# Patient Record
Sex: FEMALE | Race: WHITE | NOT HISPANIC OR LATINO | Employment: UNEMPLOYED | URBAN - METROPOLITAN AREA
[De-identification: names, ages, dates, MRNs, and addresses within clinical notes are randomized per-mention and may not be internally consistent; named-entity substitution may affect disease eponyms.]

---

## 2022-09-19 ENCOUNTER — TRANSFERRED RECORDS (OUTPATIENT)
Dept: HEALTH INFORMATION MANAGEMENT | Facility: CLINIC | Age: 57
End: 2022-09-19

## 2022-09-26 ENCOUNTER — TRANSFERRED RECORDS (OUTPATIENT)
Dept: HEALTH INFORMATION MANAGEMENT | Facility: CLINIC | Age: 57
End: 2022-09-26

## 2023-02-03 ENCOUNTER — PRE VISIT (OUTPATIENT)
Dept: ORTHOPEDICS | Facility: CLINIC | Age: 58
End: 2023-02-03

## 2023-02-03 ENCOUNTER — OFFICE VISIT (OUTPATIENT)
Dept: ORTHOPEDICS | Facility: CLINIC | Age: 58
End: 2023-02-03
Payer: COMMERCIAL

## 2023-02-03 ENCOUNTER — ANCILLARY PROCEDURE (OUTPATIENT)
Dept: GENERAL RADIOLOGY | Facility: CLINIC | Age: 58
End: 2023-02-03
Attending: ORTHOPAEDIC SURGERY
Payer: COMMERCIAL

## 2023-02-03 ENCOUNTER — LAB (OUTPATIENT)
Dept: LAB | Facility: CLINIC | Age: 58
End: 2023-02-03
Payer: COMMERCIAL

## 2023-02-03 VITALS — HEIGHT: 67 IN | WEIGHT: 175 LBS | BODY MASS INDEX: 27.47 KG/M2

## 2023-02-03 DIAGNOSIS — M79.671 RIGHT FOOT PAIN: Primary | ICD-10-CM

## 2023-02-03 DIAGNOSIS — R22.41 FOOT MASS, RIGHT: ICD-10-CM

## 2023-02-03 DIAGNOSIS — M79.671 RIGHT FOOT PAIN: ICD-10-CM

## 2023-02-03 DIAGNOSIS — M05.79 RHEUMATOID ARTHRITIS INVOLVING MULTIPLE SITES WITH POSITIVE RHEUMATOID FACTOR (H): ICD-10-CM

## 2023-02-03 LAB
BASOPHILS # BLD AUTO: 0.1 10E3/UL (ref 0–0.2)
BASOPHILS NFR BLD AUTO: 1 %
CRP SERPL-MCNC: <3 MG/L
EOSINOPHIL # BLD AUTO: 0.3 10E3/UL (ref 0–0.7)
EOSINOPHIL NFR BLD AUTO: 4 %
ERYTHROCYTE [DISTWIDTH] IN BLOOD BY AUTOMATED COUNT: 14.1 % (ref 10–15)
ERYTHROCYTE [SEDIMENTATION RATE] IN BLOOD BY WESTERGREN METHOD: 7 MM/HR (ref 0–30)
HCT VFR BLD AUTO: 38.3 % (ref 35–47)
HGB BLD-MCNC: 12.8 G/DL (ref 11.7–15.7)
IMM GRANULOCYTES # BLD: 0 10E3/UL
IMM GRANULOCYTES NFR BLD: 0 %
LYMPHOCYTES # BLD AUTO: 2.1 10E3/UL (ref 0.8–5.3)
LYMPHOCYTES NFR BLD AUTO: 30 %
MCH RBC QN AUTO: 32 PG (ref 26.5–33)
MCHC RBC AUTO-ENTMCNC: 33.4 G/DL (ref 31.5–36.5)
MCV RBC AUTO: 96 FL (ref 78–100)
MONOCYTES # BLD AUTO: 0.6 10E3/UL (ref 0–1.3)
MONOCYTES NFR BLD AUTO: 8 %
NEUTROPHILS # BLD AUTO: 4.1 10E3/UL (ref 1.6–8.3)
NEUTROPHILS NFR BLD AUTO: 57 %
NRBC # BLD AUTO: 0 10E3/UL
NRBC BLD AUTO-RTO: 0 /100
PLATELET # BLD AUTO: 255 10E3/UL (ref 150–450)
RBC # BLD AUTO: 4 10E6/UL (ref 3.8–5.2)
WBC # BLD AUTO: 7.2 10E3/UL (ref 4–11)

## 2023-02-03 PROCEDURE — 85025 COMPLETE CBC W/AUTO DIFF WBC: CPT | Performed by: PATHOLOGY

## 2023-02-03 PROCEDURE — 85652 RBC SED RATE AUTOMATED: CPT | Performed by: PATHOLOGY

## 2023-02-03 PROCEDURE — 73630 X-RAY EXAM OF FOOT: CPT | Mod: RT | Performed by: RADIOLOGY

## 2023-02-03 PROCEDURE — 99204 OFFICE O/P NEW MOD 45 MIN: CPT | Performed by: ORTHOPAEDIC SURGERY

## 2023-02-03 PROCEDURE — 36415 COLL VENOUS BLD VENIPUNCTURE: CPT | Performed by: PATHOLOGY

## 2023-02-03 PROCEDURE — 86140 C-REACTIVE PROTEIN: CPT | Performed by: PATHOLOGY

## 2023-02-03 RX ORDER — FOLIC ACID 1 MG/1
1 TABLET ORAL
COMMUNITY
Start: 2022-10-10

## 2023-02-03 ASSESSMENT — ENCOUNTER SYMPTOMS
ARTHRALGIAS: 1
BACK PAIN: 0
STIFFNESS: 0
MUSCLE WEAKNESS: 0
MYALGIAS: 1
JOINT SWELLING: 1
NECK PAIN: 0
MUSCLE CRAMPS: 0

## 2023-02-03 NOTE — LETTER
"    2/3/2023         RE: Angie Leavitt  [address] [place] [address]        Dear Colleague,    Thank you for referring your patient, Angie Leavitt, to the [facility] [place]. Please see a copy of my visit note below.    Orthopaedic Surgery Clinic Note - New Patient      Chief Complaint:  Painful right foot mass.      History:  I had the opportunity to meet this pleasant 57-year-old patient today, as a new patient to me, for the above chief complaint. History is obtained from interview with the patient and from review of the EHR.  She has a history of rheumatoid arthritis. She has a history of having developed a mass at the right foot fifth MTP joint. She reports to me that this was not significantly painful for her at that time. She underwent excision of this mass and apparently a concurrent corticosteroid injection on 05/24/2022 by Dr. Unique Bass DPM, at [facility]. Pathology results are reviewed showing a final diagnosis of a \"benign cyst.\" The patient had continuing pain and swelling afterwards. She saw Dr. Chris Chavez DO, at [facility] for a confirmatory consultation, the office note from which is reviewed. Another MRI scan was performed in 09/2022 around the time she saw Dr. Chavez, which is not currently available. She returned to see Dr. Chavez afterwards and a recommendation was made to follow up with her rheumatologist. She has had such severe pain and prominence in the right foot at the surgical site that it has made shoewear difficult.  Her pain is worse that it was prior to her surgery. She has tried prednisone for the pain and swelling which she states did not help. She has tried foot orthoses. She denies any recent fevers, chills, chest pain, shortness of breath, or surgical site drainage.      Past Medical History:  No past medical history on file.  Review of the EHR shows a history of rheumatoid arthritis on methotrexate, left total hip " "arthroplasty, night sweats, and fatigue.    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      Medications:  Current Outpatient Medications   Medication Sig Dispense Refill     folic acid (FOLVITE) 1 MG tablet Take 1 tablet by mouth daily at 2 pm       methotrexate 2.5 MG tablet TAKE 7 TABLETS BY MOUTH EVERY WEEK         Exam:  Ht 1.702 m (5' 7\")   Wt 79.4 kg (175 lb)   BMI 27.41 kg/m      General: On examination today, the patient is pleasant, cooperative, awake and alert, and in no acute distress.  Nonseptic appearing from a general clinical standpoint.  Normal shoewear, removed for examination on both feet. No assistive gait devices visible.   Pulm: Respirations are nonlabored and regular on room air.  Dermatologic: The skin on the right foot and ankle is intact without visible open wounds, blisters, or any skin that appears to be at immediate obvious risk. Swollen enlargement at the right fifth MTP joint with erythema and a well-healed appearing surgical scar. There is a similar and somewhat smaller lesion of the contralateral left fifth MTP joint. No other visible or palpable masses/nodules in the right foot.  Musculoskeletal (focused exam of the righfoot/ankle): Palpable, tender, erytheatous mass versus swelling as noted at the right 5th MTP joint. Fifth toe adducted without visible active motion with motor testing as noted below. There is very little discernible passive motion present at the fifth MTP joint and attempts to  manipulate it appear painful.  Circulation: 3/4 easily palpable right PT pulse and 1/4 nonpalpable but Dopplerable right DP pulse with a warm well-perfused " foot.  Neurologic: Light touch sensation endorsed as intact right SPN, DPN, and tibial distributions, and diminished in right saphenous and sural distributions. 5/5 R TA, EHL, GS, PTT, and peroneals. 5/5 R EDL and FDL for toes 2-4 without visible active motion of 5th toe.      Imaging:  Independent review of imaging studies was performed including weightbearing AP, oblique, and lateral right foot radiographs dated today, 02/03/2023. Apparent bony erosion on the lateral aspect of the right 5th metatarsal head. Dorsomedially subluxated R 5th toe. Soft tissue swelling lateral to R 5th MTP joint. Subluxation and swelling similar to and somewhat progressed compared with 08/22/2022 radiographs. The relevant findings were discussed with and shown to the patient. No obvious evidence for tumor, infection, or acute fracture. Two screws present in medial malleolus.      Impression:  Recurrent soft tissue mass/swelling of right 5th MTP joint in a 57 year old female patient with rheumatoid arthritis.       Plan:  The above diagnosis and both nonsurgical and surgical treatment options were discussed with the patient all in layman's terms. She verbalized her wish to have the painful swelling and mass removed. I discussed the option of a revision excisional biopsy of right foot mass and reduction of the subluxated/dislocated right fifth MTP joint, including the nature of the procedure, the risks, benefits, and alternatives, and reasonable expectations for outcome. I discussed that in the small but nonzero chance of malignancy this could require additional surgery including amputation to obtain a wide enough margin. I discussed that the chance of this is low enough that it did not require an initial staged incisional biopsy. I discussed that surgery could involve an extensor tenotomy of the fifth toe and/or a shortening osteotomy of the fifth metatarsal to reduce the chronic subluxation/dislocation of the fifth MTP joint. I  discussed the plan for operative cultures as well to rule out infection. First I recommend obtaining preop ESR, CRP, and CBC/diff and obtaining the most recent 09/2022 [facility] MRI scan. Follow-up in 1-2 weeks to discuss. Signs and symptoms watch out for that should prompt immediate medical attention were discussed. All questions that this pleasant patient had at the time were answered to the best of my ability, and she verbalized understanding of and agreement with the treatment plan.  Answers for HPI/ROS submitted by the patient on 2/3/2023  General Symptoms: No  Skin Symptoms: No  HENT Symptoms: No  EYE SYMPTOMS: No  HEART SYMPTOMS: No  LUNG SYMPTOMS: No  INTESTINAL SYMPTOMS: No  URINARY SYMPTOMS: No  GYNECOLOGIC SYMPTOMS: No  BREAST SYMPTOMS: No  SKELETAL SYMPTOMS: Yes  BLOOD SYMPTOMS: No  NERVOUS SYSTEM SYMPTOMS: No  MENTAL HEALTH SYMPTOMS: No  Back pain: No  Muscle aches: Yes  Neck pain: No  Swollen joints: Yes  Joint pain: Yes  Bone pain: Yes  Muscle cramps: No  Muscle weakness: No  Joint stiffness: No  Bone fracture: No          Again, thank you for allowing me to participate in the care of your patient.        Sincerely,        Jose Antonio Calix MD

## 2023-02-03 NOTE — NURSING NOTE
"Reason For Visit:   Chief Complaint   Patient presents with     Consult     Right foot surgery - mass excision 5/2022.  Pain and mass still there, worsening. 3rd opinion. Was referred to rheumatologist. No additional foot surgery recommendations. Original surgeon \"won't see her\" Just said to go to PT.        Ht 1.702 m (5' 7\")   Wt 79.4 kg (175 lb)   BMI 27.41 kg/m           Miryam Garcia ATC    "

## 2023-02-03 NOTE — LETTER
Date:February 6, 2023      Provider requested that no letter be sent. Do not send.       [facility]

## 2023-02-03 NOTE — TELEPHONE ENCOUNTER
Action February 3, 2023 8:43 AM MT   Action Taken Sent an urgent request to  for imaging [phone].     DIAGNOSIS: 2nd opinion - Right foot pain, swollen,  lump on outside of small toe  / h/o of surgery   APPOINTMENT DATE: 02/03/2023   NOTES STATUS DETAILS   OFFICE NOTE from referring provider SELF    OFFICE NOTE from other specialist Care Everywhere 12/16/2022 - Trisha Julian MD -  Internal Med    08/22/2022 - Anand Berrios   OPERATIVE REPORT Care Everywhere 05/24/2022 - Excision of Soft Tissue Mass of RT Foot   MEDICATION LIST Care Everywhere    LABS     MRI PACS HP:  05/04/2022 - RT Foot   XRAYS (IMAGES & REPORTS) PACS HP:  08/22/2022 - RT Foot   TUMOR     PATHOLOGY  Slides & report Care Everywhere Collected: 05/24/2022  Case #: [id number]  Specimen: RT Foot  FINAL DIAGNOSIS: Foot, right soft tissue mass, biopsy:  Benign Cyst

## 2023-02-04 NOTE — PROGRESS NOTES
"Orthopaedic Surgery Clinic Note - New Patient      Chief Complaint:  Painful right foot mass.      History:  I had the opportunity to meet this pleasant 57-year-old patient today, as a new patient to me, for the above chief complaint. History is obtained from interview with the patient and from review of the EHR.  She has a history of rheumatoid arthritis. She has a history of having developed a mass at the right foot fifth MTP joint. She reports to me that this was not significantly painful for her at that time. She underwent excision of this mass and apparently a concurrent corticosteroid injection on 05/24/2022 by Dr. Unique Bass DPM, at [facility]. Pathology results are reviewed showing a final diagnosis of a \"benign cyst.\" The patient had continuing pain and swelling afterwards. She saw Dr. Chris Chavez DO, at [facility] for a confirmatory consultation, the office note from which is reviewed. Another MRI scan was performed in 09/2022 around the time she saw Dr. Chavez, which is not currently available. She returned to see Dr. Chavez afterwards and a recommendation was made to follow up with her rheumatologist. She has had such severe pain and prominence in the right foot at the surgical site that it has made shoewear difficult.  Her pain is worse that it was prior to her surgery. She has tried prednisone for the pain and swelling which she states did not help. She has tried foot orthoses. She denies any recent fevers, chills, chest pain, shortness of breath, or surgical site drainage.      Past Medical History:  No past medical history on file.  Review of the EHR shows a history of rheumatoid arthritis on methotrexate, left total hip arthroplasty, night sweats, and fatigue.    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      Medications:  Current Outpatient Medications   Medication Sig Dispense Refill     folic acid (FOLVITE) 1 MG tablet Take 1 tablet by mouth daily at 2 pm       methotrexate 2.5 MG tablet TAKE 7 TABLETS BY MOUTH EVERY WEEK         Exam:  Ht 1.702 m (5' 7\")   Wt 79.4 kg (175 lb)   BMI 27.41 kg/m      General: On examination today, the patient is pleasant, cooperative, awake and alert, and in no acute distress.  Nonseptic appearing from a general clinical standpoint.  Normal shoewear, removed for examination on both feet. No assistive gait devices visible.   Pulm: Respirations are nonlabored and regular on room air.  Dermatologic: The skin on the right foot and ankle is intact without visible open wounds, blisters, or any skin that appears to be at immediate obvious risk. Swollen enlargement at the right fifth MTP joint with erythema and a well-healed appearing surgical scar. There is a similar and somewhat smaller lesion of the contralateral left fifth MTP joint. No other visible or palpable masses/nodules in the right foot.  Musculoskeletal (focused exam of the righfoot/ankle): Palpable, tender, erytheatous mass versus swelling as noted at the right 5th MTP joint. Fifth toe adducted without visible active motion with motor testing as noted below. There is very little discernible passive motion present at the fifth MTP joint and attempts to  manipulate it appear painful.  Circulation: 3/4 easily palpable right PT pulse and 1/4 nonpalpable but Dopplerable right DP pulse with a warm well-perfused foot.  Neurologic: Light touch sensation endorsed as intact right SPN, DPN, and tibial distributions, and diminished in right saphenous and sural distributions. 5/5 R TA, EHL, GS, PTT, and peroneals. 5/5 R EDL and FDL for toes 2-4 without visible active motion of 5th " toe.      Imaging:  Independent review of imaging studies was performed including weightbearing AP, oblique, and lateral right foot radiographs dated today, 02/03/2023. Apparent bony erosion on the lateral aspect of the right 5th metatarsal head. Dorsomedially subluxated R 5th toe. Soft tissue swelling lateral to R 5th MTP joint. Subluxation and swelling similar to and somewhat progressed compared with 08/22/2022 radiographs. The relevant findings were discussed with and shown to the patient. No obvious evidence for tumor, infection, or acute fracture. Two screws present in medial malleolus.      Impression:  Recurrent soft tissue mass/swelling of right 5th MTP joint in a 57 year old female patient with rheumatoid arthritis.       Plan:  The above diagnosis and both nonsurgical and surgical treatment options were discussed with the patient all in layman's terms. She verbalized her wish to have the painful swelling and mass removed. I discussed the option of a revision excisional biopsy of right foot mass and reduction of the subluxated/dislocated right fifth MTP joint, including the nature of the procedure, the risks, benefits, and alternatives, and reasonable expectations for outcome. I discussed that in the small but nonzero chance of malignancy this could require additional surgery including amputation to obtain a wide enough margin. I discussed that the chance of this is low enough that it did not require an initial staged incisional biopsy. I discussed that surgery could involve an extensor tenotomy of the fifth toe and/or a shortening osteotomy of the fifth metatarsal to reduce the chronic subluxation/dislocation of the fifth MTP joint. I discussed the plan for operative cultures as well to rule out infection. First I recommend obtaining preop ESR, CRP, and CBC/diff and obtaining the most recent 09/2022 [facility] MRI scan. Follow-up in 1-2 weeks to discuss. Signs and symptoms watch out for that should  prompt immediate medical attention were discussed. All questions that this pleasant patient had at the time were answered to the best of my ability, and she verbalized understanding of and agreement with the treatment plan.  Answers for HPI/ROS submitted by the patient on 2/3/2023  General Symptoms: No  Skin Symptoms: No  HENT Symptoms: No  EYE SYMPTOMS: No  HEART SYMPTOMS: No  LUNG SYMPTOMS: No  INTESTINAL SYMPTOMS: No  URINARY SYMPTOMS: No  GYNECOLOGIC SYMPTOMS: No  BREAST SYMPTOMS: No  SKELETAL SYMPTOMS: Yes  BLOOD SYMPTOMS: No  NERVOUS SYSTEM SYMPTOMS: No  MENTAL HEALTH SYMPTOMS: No  Back pain: No  Muscle aches: Yes  Neck pain: No  Swollen joints: Yes  Joint pain: Yes  Bone pain: Yes  Muscle cramps: No  Muscle weakness: No  Joint stiffness: No  Bone fracture: No

## 2023-02-10 ENCOUNTER — OFFICE VISIT (OUTPATIENT)
Dept: ORTHOPEDICS | Facility: CLINIC | Age: 58
End: 2023-02-10
Payer: COMMERCIAL

## 2023-02-10 VITALS — HEIGHT: 67 IN | WEIGHT: 145 LBS | BODY MASS INDEX: 22.76 KG/M2

## 2023-02-10 DIAGNOSIS — S93.104D: ICD-10-CM

## 2023-02-10 DIAGNOSIS — M25.871 MASS OF JOINT OF RIGHT FOOT: Primary | ICD-10-CM

## 2023-02-10 PROCEDURE — 99214 OFFICE O/P EST MOD 30 MIN: CPT | Performed by: ORTHOPAEDIC SURGERY

## 2023-02-10 NOTE — PROGRESS NOTES
Chief complaint: Follow-up right fifth MTP mass.    I saw this very pleasant 57-year-old patient today with rheumatoid arthritis in follow-up for a painful, swollen right fifth toe/MTP joint after our initial consultation last week.  For full details please refer to my detailed note from 12/3/2023.  She still reports ongoing unimproved pain and swelling in that region, after having undergone a previous excision of soft tissue mass by Dr. Unique Bass at [facility] on 5/24/2022. Acute phase reactants have been drawn, a CRP of less than 3, serum WBC of 7.2 with 57% neutrophils, and an ESR of 7, all from 2/3/2023.  Her previous MRI scan has also been made available from September 19, 2022.  I have also had a chance to discuss her case with Dr. Chris Chavez DO, of [facility], who also saw and examined her as a previous confirmatory consultation in September 2022.  As noted previously, she has a history of rheumatoid arthritis and is currently on methotrexate.  However, this was diagnosed years ago soon after her birth of her son who is now in his early 20s, and then the RA went into remission.  She believes that this nodule in the right fifth toe may have developed sometime during the early years of her remission, still many years ago.  She endorses that it was really not significantly painful until after the excision by Dr. Bass when it recurred.  She reports she has only recently developed a recurrence of the rheumatoid arthritis prompting resumption of her methotrexate.  It is also noted that she has had a left wrist mass which was aspirated by Dr. Raghav Gutierrez of [facility] on 11/3/2022.    On exam of the right foot at this time is again noted the tender, slightly erythematous, enlarged appearance of the right fifth MTP joint.  The fifth toe has very little active motion present.  It is warm and well-perfused.  There is no drainage or skin break.  The previous incision is healed and dry.  The  swelling does appear to have the appearance of a mass on the dorsal lateral aspect of the right fifth MTP region.  The mass does not transilluminate with light.    I reviewed the outside MRI scan dated 9/19/2022, the images of which but not the report have now been made available, showing the mass in the right fifth toe MTP joint.  It does appear to be heterogeneous, and appears to be located subcutaneously dorsal lateral to the fifth metatarsal head, rather dorsal to the area of the lateral fifth metatarsal head erosion.  The lesion does not appear to have a signal similar to fat.  There does appear to be bony edema in the distal fifth metatarsal as well as the proximal phalanx of the fifth toe.  Radiographs from last week, 2/3/2023, are again reviewed with the patient, and shows bony erosion of the lateral aspect of the fifth metatarsal, though the distal apical and if it does appear to have a preserved joint.  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  Both the chronic subluxation/dislocation, and the lateral fifth metatarsal head erosion, appear to be increased compared with previous radiographs.  The region at or around the soft tissue mass noted on MRI appears to be manifested as an increased density and swelling noted in the dorsal lateral aspect of the fifth MTP.  It does not appear to show calcifications.  As noted, there does appear to be an associated bony erosion of the metatarsal head.    Impression/plan:  I had a long discussion with Ms. Leavitt today about her diagnosis and treatment options for the recurrent dorsal lateral right fifth metatarsal mass that appears to have recurred after previous excision by Dr Bass in May 2022.  I discussed that the labs make it less likely that this is infection, which I discussed is always a potential concern in the setting of previous surgery (it is noted that Dr. Bass also performed a corticosteroid injection in that region  at the time of the 5/24/2022 surgery).  I discussed the very small though nonzero chance of malignant transformation. I do note that the outside pathology report did not show malignancy.  I discussed the potential future treatments that a malignancy could entail.  With regard to management of her painful recurrent mass and toe dislocation at this time I discussed the nonsurgical and surgical treatment options that she has.  Nonsurgical options could include evaluation by her rheumatologist to discuss medical treatment options, such as potentially any new pharmacologic treatment and/or even injection into the nodule itself which has been reported.  Other conservative options could include customized orthopedic shoewear and foot orthoses to accommodate the lesion.  She did politely but firmly decline the option for continued nonsurgical care.  I also discussed the option for surgery consisting of a revision excisional biopsy of the right fifth MTP mass, a shortening osteotomy of the right fifth ray, possible extensor tenotomy, and repair of the MTP joint capsule.  I discussed the shortening of the fifth ray could involve a fifth metatarsal shortening osteotomy with internal fixation, versus resection of the fifth metatarsal head and/or fifth toe phalangeal base. I discussed that in an older, more sedentary individual with lower demands resection of the metatarsal head may be a better option, but in her case as a younger person with more functional demands I would recommend at least an initial attempt at preserving the metatarsal head cartilage, though I did explain that this may not sufficiently alleviate her pain necessitating additional surgery, due to ongoing pain from the erosion on the side of the metatarsal head would still be there with a shortening osteotomy but would have been removed with a metatarsal head resection.  A mutual decision was made at this time to accept the somewhat higher risk of revision  surgery, in order to attempt to preserve the cartilage of the fifth metatarsal head for now.  Risk, benefits, and alternatives to surgery (right fifth metatarsal shortening osteotomy, possible EDL tenotomy, relocation of the fifth toe, capsular repair, and repeat excisional biopsy of the right fifth MTP joint mass) were discussed in layman's terms.  All questions Ms. Leavitt had at the time were answered to the best of my ability.  I did agree to proceeding with moving forward towards scheduling surgery as requested, but do so sometime after 2/25/2023 when she reports she has an upcoming appointment with her rheumatologist.  I requested that she discuss the above options with them. I discussed that if a suitable medical option is discussed at that time, this may may prompt delaying or canceling surgery to give the other option a try.  All questions that this very pleasant patient had at the time were answered appropriately, and she verbalized understanding of and agreement with the treatment plan.

## 2023-02-10 NOTE — LETTER
Date:February 13, 2023      Provider requested that no letter be sent. Do not send.       [facility]

## 2023-02-10 NOTE — NURSING NOTE
Teaching Flowsheet   Relevant Diagnosis: Right 5th toe mass excision and realignment.  Teaching Topic: pre op and post op teaching.     Person(s) involved in teaching:   Patient     Motivation Level:  Asks Questions: Yes  Eager to Learn: Yes  Cooperative: Yes  Receptive (willing/able to accept information): Yes  Any cultural factors/Restorationist beliefs that may influence understanding or compliance? No       Patient demonstrates understanding of the following:  Reason for the appointment, diagnosis and treatment plan: Yes  Knowledge of proper use of medications and conditions for which they are ordered (with special attention to potential side effects or drug interactions): Yes  Which situations necessitate calling provider and whom to contact: Yes       Teaching Concerns Addressed: RN discussed all aspects of pre and post op with patient. Geovanna will call patient with surgery date. Patient will go to PCP for H and P.       Proper use and care of meds (medical equip, care aids, etc.): Yes  Nutritional needs and diet plan: Yes  Pain management techniques: Yes  Wound Care: Yes  How and/when to access community resources: Yes     Instructional Materials Used/Given: Pre op packet and antibacterial soap.     Time spent with patient: 15 minutes.

## 2023-02-10 NOTE — LETTER
2/10/2023         RE: Angie HOME Leavitt  [address] [place] [address]        Dear Colleague,    Thank you for referring your patient, Angie Leavitt, to the [facility]. Please see a copy of my visit note below.    Chief complaint: Follow-up right fifth MTP mass.    I saw this very pleasant 57-year-old patient today with rheumatoid arthritis in follow-up for a painful, swollen right fifth toe/MTP joint after our initial consultation last week.  For full details please refer to my detailed note from 12/3/2023.  She still reports ongoing unimproved pain and swelling in that region, after having undergone a previous excision of soft tissue mass by Dr. Unique Bass at [facility] on 5/24/2022. Acute phase reactants have been drawn, a CRP of less than 3, serum WBC of 7.2 with 57% neutrophils, and an ESR of 7, all from 2/3/2023.  Her previous MRI scan has also been made available from September 19, 2022.  I have also had a chance to discuss her case with Dr. Chris Chavez DO, of [facility], who also saw and examined her as a previous confirmatory consultation in September 2022.  As noted previously, she has a history of rheumatoid arthritis and is currently on methotrexate.  However, this was diagnosed years ago soon after her birth of her son who is now in his early 20s, and then the RA went into remission.  She believes that this nodule in the right fifth toe may have developed sometime during the early years of her remission, still many years ago.  She endorses that it was really not significantly painful until after the excision by Dr. Bass when it recurred.  She reports she has only recently developed a recurrence of the rheumatoid arthritis prompting resumption of her methotrexate.  It is also noted that she has had a left wrist mass which was aspirated by Dr. Raghav Gutierrez of [facility] on 11/3/2022.    On exam of the right foot at this time is again  noted the tender, slightly erythematous, enlarged appearance of the right fifth MTP joint.  The fifth toe has very little active motion present.  It is warm and well-perfused.  There is no drainage or skin break.  The previous incision is healed and dry.  The swelling does appear to have the appearance of a mass on the dorsal lateral aspect of the right fifth MTP region.  The mass does not transilluminate with light.    I reviewed the outside MRI scan dated 9/19/2022, the images of which but not the report have now been made available, showing the mass in the right fifth toe MTP joint.  It does appear to be heterogeneous, and appears to be located subcutaneously dorsal lateral to the fifth metatarsal head, rather dorsal to the area of the lateral fifth metatarsal head erosion.  The lesion does not appear to have a signal similar to fat.  There does appear to be bony edema in the distal fifth metatarsal as well as the proximal phalanx of the fifth toe.  Radiographs from last week, 2/3/2023, are again reviewed with the patient, and shows bony erosion of the lateral aspect of the fifth metatarsal, though the distal apical and if it does appear to have a preserved joint.  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  Both the chronic subluxation/dislocation, and the lateral fifth metatarsal head erosion, appear to be increased compared with previous radiographs.  The region at or around the soft tissue mass noted on MRI appears to be manifested as an increased density and swelling noted in the dorsal lateral aspect of the fifth MTP.  It does not appear to show calcifications.  As noted, there does appear to be an associated bony erosion of the metatarsal head.    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  I discussed that the labs make it less likely that this is infection, which I discussed is always a potential concern in the setting of previous surgery (it is noted that Dr. Bass also performed a corticosteroid injection in that region at the time of the 5/24/2022 surgery).  I discussed the very small though nonzero chance of malignant transformation. I do note that the outside pathology report did not show malignancy.  I discussed the potential future treatments that a malignancy could entail.  With regard to management of her painful recurrent mass and toe dislocation at this time I discussed the nonsurgical and surgical treatment options that she has.  Nonsurgical options could include evaluation by her rheumatologist to discuss medical treatment options, such as potentially any new pharmacologic treatment and/or even injection into the nodule itself which has been reported.  Other conservative options could include customized orthopedic shoewear and foot orthoses to accommodate the lesion.  She did politely but firmly decline the option for continued nonsurgical care.  I also discussed the option for surgery consisting of a revision excisional biopsy of the right fifth MTP mass, a shortening osteotomy of the right fifth ray, possible extensor tenotomy, and repair of the MTP joint capsule.  I discussed the shortening of the fifth ray could involve a fifth metatarsal shortening osteotomy with internal fixation, versus resection of the fifth metatarsal head and/or fifth toe phalangeal base. I discussed that in an older, more sedentary individual with lower demands resection of the metatarsal head may be a better option, but in her case as a younger person with more functional demands I would recommend at least an initial attempt at preserving the metatarsal head cartilage, though I did explain that this may not sufficiently alleviate her pain necessitating additional surgery, due to  ongoing pain from the erosion on the side of the metatarsal head would still be there with a shortening osteotomy but would have been removed with a metatarsal head resection.  A mutual decision was made at this time to accept the somewhat higher risk of revision surgery, in order to attempt to preserve the cartilage of the fifth metatarsal head for now.  Risk, benefits, and alternatives to surgery (right fifth metatarsal shortening osteotomy, possible EDL tenotomy, relocation of the fifth toe, capsular repair, and repeat excisional biopsy of the right fifth MTP joint mass) were discussed in layman's terms.  All questions Ms. Leavitt had at the time were answered to the best of my ability.  I did agree to proceeding with moving forward towards scheduling surgery as requested, but do so sometime after 2/25/2023 when she reports she has an upcoming appointment with her rheumatologist.  I requested that she discuss the above options with them. I discussed that if a suitable medical option is discussed at that time, this may may prompt delaying or canceling surgery to give the other option a try.  All questions that this very pleasant patient had at the time were answered appropriately, and she verbalized understanding of and agreement with the treatment plan.      Again, thank you for allowing me to participate in the care of your patient.        Sincerely,        Jose Antonio Calix MD

## 2023-02-10 NOTE — NURSING NOTE
"Reason For Visit:   Chief Complaint   Patient presents with     RECHECK     Return to recheck lab work and review MRI from [place] in 09/2022. Right foot pain, swollen,  lump on outside of small toe . XR 02/03/2023.        Ht 1.702 m (5' 7\")   Wt 65.8 kg (145 lb)   BMI 22.71 kg/m      Pain Assessment  Patient Currently in Pain: Yes  0-10 Pain Scale: 4  Primary Pain Location: Foot (Right)    Anjum Irvin, EMT  "

## 2023-02-12 ENCOUNTER — HEALTH MAINTENANCE LETTER (OUTPATIENT)
Age: 58
End: 2023-02-12

## 2023-02-13 ENCOUNTER — TELEPHONE (OUTPATIENT)
Dept: ORTHOPEDICS | Facility: CLINIC | Age: 58
End: 2023-02-13
Payer: COMMERCIAL

## 2023-02-13 NOTE — TELEPHONE ENCOUNTER
Records Requested     February 13, 2023 11:45 AM  [address] [facility]  [address] [facility], [address]  [place]  [address]  Phone: [phone]  Fax:  [phone]   Outcome Provider is wanting the MRI report, patient did not sign an CHANCE with [facility] to release records. Called patient and left a voicemail to see how I can get an CHANCE to her to sign for her [facility] records. Left my direct number for call back [phone] (ok to lvm ) - Amay     12PM emailed CHANCE to [email or web address]  1:45PM received signed CHANCE, sent urgent req - Amay     Records Requested     February 13, 2023 11:55 AM  [address]  [facility] - Dago Dalal M.D.  [place] Office  [address] [place] [address], [address]  [place] [address]  Phone: [phone]  Fax: [phone]   Outcome Per patient wants to sign an CHANCE for these records as well so that we can get request them   12PM emailed CHANCE to [email or web address]  1:45PM received signed CHANCE, sent urgent req - Amay

## 2023-02-21 ENCOUNTER — TELEPHONE (OUTPATIENT)
Dept: ORTHOPEDICS | Facility: CLINIC | Age: 58
End: 2023-02-21
Payer: COMMERCIAL

## 2023-02-21 NOTE — TELEPHONE ENCOUNTER
Phoned patient to get him scheduled for surgery with Dr. Calix     Patient was unavailable,   Provided call back number in voicemail:   [phone].

## 2023-02-22 PROBLEM — S93.104D: Status: ACTIVE | Noted: 2023-02-22

## 2023-02-22 PROBLEM — M25.871 MASS OF JOINT OF RIGHT FOOT: Status: ACTIVE | Noted: 2023-02-22

## 2023-02-22 NOTE — TELEPHONE ENCOUNTER
Patient is scheduled for surgery with Dr. Calix    Spoke with: Angie    Date of Surgery: 4/3/23    Location: ASC    Informed patient they will need an adult  Yes    H&P: Scheduled with PCP    Pre-procedure COVID-19 Test: N/A    Additional imaging/appointments: POP Made    Surgery packet: Received     Additional comments: N/A

## 2023-02-22 NOTE — TELEPHONE ENCOUNTER
Second attempt;   Phoned patient to get him scheduled for surgery with Dr. Calix      Patient was unavailable,   Provided call back number in voicemail:   [phone].

## 2023-03-21 ENCOUNTER — TELEPHONE (OUTPATIENT)
Dept: ORTHOPEDICS | Facility: CLINIC | Age: 58
End: 2023-03-21
Payer: COMMERCIAL

## 2023-03-31 ENCOUNTER — ANESTHESIA EVENT (OUTPATIENT)
Dept: SURGERY | Facility: AMBULATORY SURGERY CENTER | Age: 58
End: 2023-03-31
Payer: COMMERCIAL

## 2023-04-03 ENCOUNTER — ANCILLARY PROCEDURE (OUTPATIENT)
Dept: RADIOLOGY | Facility: AMBULATORY SURGERY CENTER | Age: 58
End: 2023-04-03
Attending: ORTHOPAEDIC SURGERY
Payer: COMMERCIAL

## 2023-04-03 ENCOUNTER — ANESTHESIA (OUTPATIENT)
Dept: SURGERY | Facility: AMBULATORY SURGERY CENTER | Age: 58
End: 2023-04-03
Payer: COMMERCIAL

## 2023-04-03 ENCOUNTER — HOSPITAL ENCOUNTER (OUTPATIENT)
Facility: AMBULATORY SURGERY CENTER | Age: 58
Discharge: HOME | End: 2023-04-03
Attending: ORTHOPAEDIC SURGERY | Admitting: ORTHOPAEDIC SURGERY
Payer: COMMERCIAL

## 2023-04-03 VITALS
SYSTOLIC BLOOD PRESSURE: 115 MMHG | RESPIRATION RATE: 14 BRPM | HEIGHT: 67 IN | WEIGHT: 150 LBS | OXYGEN SATURATION: 98 % | DIASTOLIC BLOOD PRESSURE: 79 MMHG | TEMPERATURE: 98.3 F | HEART RATE: 70 BPM | BODY MASS INDEX: 23.54 KG/M2

## 2023-04-03 DIAGNOSIS — M05.79 RHEUMATOID ARTHRITIS INVOLVING MULTIPLE SITES WITH POSITIVE RHEUMATOID FACTOR (H): Primary | ICD-10-CM

## 2023-04-03 DIAGNOSIS — M25.871 MASS OF JOINT OF RIGHT FOOT: ICD-10-CM

## 2023-04-03 DIAGNOSIS — M21.961 DEFORMITY OF METATARSAL BONE OF RIGHT FOOT: ICD-10-CM

## 2023-04-03 DIAGNOSIS — S93.104D: ICD-10-CM

## 2023-04-03 PROCEDURE — C9290 INJ, BUPIVACAINE LIPOSOME: HCPCS

## 2023-04-03 PROCEDURE — 88305 TISSUE EXAM BY PATHOLOGIST: CPT | Mod: TC | Performed by: ORTHOPAEDIC SURGERY

## 2023-04-03 PROCEDURE — 28308 INCISION OF METATARSAL: CPT | Mod: GC | Performed by: ORTHOPAEDIC SURGERY

## 2023-04-03 PROCEDURE — 88305 TISSUE EXAM BY PATHOLOGIST: CPT | Mod: 26 | Performed by: PATHOLOGY

## 2023-04-03 PROCEDURE — 28308 INCISION OF METATARSAL: CPT | Mod: RT

## 2023-04-03 DEVICE — IMPLANTABLE DEVICE: Type: IMPLANTABLE DEVICE | Site: TOE | Status: FUNCTIONAL

## 2023-04-03 RX ORDER — FLUMAZENIL 0.1 MG/ML
0.2 INJECTION, SOLUTION INTRAVENOUS
Status: DISCONTINUED | OUTPATIENT
Start: 2023-04-03 | End: 2023-04-04 | Stop reason: HOSPADM

## 2023-04-03 RX ORDER — GABAPENTIN 300 MG/1
300 CAPSULE ORAL
Status: COMPLETED | OUTPATIENT
Start: 2023-04-03 | End: 2023-04-03

## 2023-04-03 RX ORDER — FENTANYL CITRATE 50 UG/ML
25-50 INJECTION, SOLUTION INTRAMUSCULAR; INTRAVENOUS
Status: DISCONTINUED | OUTPATIENT
Start: 2023-04-03 | End: 2023-04-04 | Stop reason: HOSPADM

## 2023-04-03 RX ORDER — NALOXONE HYDROCHLORIDE 0.4 MG/ML
0.4 INJECTION, SOLUTION INTRAMUSCULAR; INTRAVENOUS; SUBCUTANEOUS
Status: DISCONTINUED | OUTPATIENT
Start: 2023-04-03 | End: 2023-04-04 | Stop reason: HOSPADM

## 2023-04-03 RX ORDER — GINSENG 100 MG
CAPSULE ORAL PRN
Status: DISCONTINUED | OUTPATIENT
Start: 2023-04-03 | End: 2023-04-03 | Stop reason: HOSPADM

## 2023-04-03 RX ORDER — PROPOFOL 10 MG/ML
INJECTION, EMULSION INTRAVENOUS PRN
Status: DISCONTINUED | OUTPATIENT
Start: 2023-04-03 | End: 2023-04-03

## 2023-04-03 RX ORDER — SODIUM CHLORIDE, SODIUM LACTATE, POTASSIUM CHLORIDE, CALCIUM CHLORIDE 600; 310; 30; 20 MG/100ML; MG/100ML; MG/100ML; MG/100ML
INJECTION, SOLUTION INTRAVENOUS CONTINUOUS
Status: DISCONTINUED | OUTPATIENT
Start: 2023-04-03 | End: 2023-04-04 | Stop reason: HOSPADM

## 2023-04-03 RX ORDER — HYDROXYZINE HYDROCHLORIDE 25 MG/1
25 TABLET, FILM COATED ORAL
Status: DISCONTINUED | OUTPATIENT
Start: 2023-04-03 | End: 2023-04-04 | Stop reason: HOSPADM

## 2023-04-03 RX ORDER — ACETAMINOPHEN 325 MG/1
975 TABLET ORAL ONCE
Status: COMPLETED | OUTPATIENT
Start: 2023-04-03 | End: 2023-04-03

## 2023-04-03 RX ORDER — LIDOCAINE 40 MG/G
CREAM TOPICAL
Status: DISCONTINUED | OUTPATIENT
Start: 2023-04-03 | End: 2023-04-04 | Stop reason: HOSPADM

## 2023-04-03 RX ORDER — FENTANYL CITRATE 50 UG/ML
25 INJECTION, SOLUTION INTRAMUSCULAR; INTRAVENOUS EVERY 5 MIN PRN
Status: DISCONTINUED | OUTPATIENT
Start: 2023-04-03 | End: 2023-04-04 | Stop reason: HOSPADM

## 2023-04-03 RX ORDER — ONDANSETRON 2 MG/ML
INJECTION INTRAMUSCULAR; INTRAVENOUS PRN
Status: DISCONTINUED | OUTPATIENT
Start: 2023-04-03 | End: 2023-04-03

## 2023-04-03 RX ORDER — NALOXONE HYDROCHLORIDE 0.4 MG/ML
0.2 INJECTION, SOLUTION INTRAMUSCULAR; INTRAVENOUS; SUBCUTANEOUS
Status: DISCONTINUED | OUTPATIENT
Start: 2023-04-03 | End: 2023-04-04 | Stop reason: HOSPADM

## 2023-04-03 RX ORDER — ONDANSETRON 4 MG/1
4 TABLET, ORALLY DISINTEGRATING ORAL
Status: DISCONTINUED | OUTPATIENT
Start: 2023-04-03 | End: 2023-04-04 | Stop reason: HOSPADM

## 2023-04-03 RX ORDER — ONDANSETRON 4 MG/1
4 TABLET, ORALLY DISINTEGRATING ORAL EVERY 8 HOURS PRN
Qty: 4 TABLET | Refills: 0 | Status: SHIPPED | OUTPATIENT
Start: 2023-04-03

## 2023-04-03 RX ORDER — ASPIRIN 81 MG/1
162 TABLET, CHEWABLE ORAL DAILY
Status: DISCONTINUED | OUTPATIENT
Start: 2023-04-03 | End: 2023-04-04 | Stop reason: HOSPADM

## 2023-04-03 RX ORDER — BUPIVACAINE HYDROCHLORIDE 2.5 MG/ML
INJECTION, SOLUTION EPIDURAL; INFILTRATION; INTRACAUDAL
Status: COMPLETED | OUTPATIENT
Start: 2023-04-03 | End: 2023-04-03

## 2023-04-03 RX ORDER — FENTANYL CITRATE 50 UG/ML
INJECTION, SOLUTION INTRAMUSCULAR; INTRAVENOUS PRN
Status: DISCONTINUED | OUTPATIENT
Start: 2023-04-03 | End: 2023-04-03

## 2023-04-03 RX ORDER — OXYCODONE HYDROCHLORIDE 5 MG/1
5 TABLET ORAL
Status: COMPLETED | OUTPATIENT
Start: 2023-04-03 | End: 2023-04-03

## 2023-04-03 RX ORDER — HYDROMORPHONE HYDROCHLORIDE 1 MG/ML
0.4 INJECTION, SOLUTION INTRAMUSCULAR; INTRAVENOUS; SUBCUTANEOUS EVERY 5 MIN PRN
Status: DISCONTINUED | OUTPATIENT
Start: 2023-04-03 | End: 2023-04-04 | Stop reason: HOSPADM

## 2023-04-03 RX ORDER — MAGNESIUM HYDROXIDE 1200 MG/15ML
LIQUID ORAL PRN
Status: DISCONTINUED | OUTPATIENT
Start: 2023-04-03 | End: 2023-04-03 | Stop reason: HOSPADM

## 2023-04-03 RX ORDER — CEFAZOLIN SODIUM 1 G/3ML
INJECTION, POWDER, FOR SOLUTION INTRAMUSCULAR; INTRAVENOUS PRN
Status: DISCONTINUED | OUTPATIENT
Start: 2023-04-03 | End: 2023-04-03

## 2023-04-03 RX ORDER — AMOXICILLIN 250 MG
1-2 CAPSULE ORAL 2 TIMES DAILY
Qty: 30 TABLET | Refills: 0 | Status: SHIPPED | OUTPATIENT
Start: 2023-04-03

## 2023-04-03 RX ORDER — HYDROMORPHONE HYDROCHLORIDE 1 MG/ML
0.2 INJECTION, SOLUTION INTRAMUSCULAR; INTRAVENOUS; SUBCUTANEOUS EVERY 5 MIN PRN
Status: DISCONTINUED | OUTPATIENT
Start: 2023-04-03 | End: 2023-04-04 | Stop reason: HOSPADM

## 2023-04-03 RX ORDER — LIDOCAINE HYDROCHLORIDE 20 MG/ML
INJECTION, SOLUTION INFILTRATION; PERINEURAL PRN
Status: DISCONTINUED | OUTPATIENT
Start: 2023-04-03 | End: 2023-04-03

## 2023-04-03 RX ORDER — KETOROLAC TROMETHAMINE 30 MG/ML
30 INJECTION, SOLUTION INTRAMUSCULAR; INTRAVENOUS ONCE
Status: COMPLETED | OUTPATIENT
Start: 2023-04-03 | End: 2023-04-03

## 2023-04-03 RX ORDER — ASPIRIN 81 MG/1
162 TABLET, CHEWABLE ORAL DAILY
Qty: 84 TABLET | Refills: 0 | Status: SHIPPED | OUTPATIENT
Start: 2023-04-03 | End: 2023-05-15

## 2023-04-03 RX ORDER — ONDANSETRON 4 MG/1
4 TABLET, ORALLY DISINTEGRATING ORAL EVERY 30 MIN PRN
Status: DISCONTINUED | OUTPATIENT
Start: 2023-04-03 | End: 2023-04-04 | Stop reason: HOSPADM

## 2023-04-03 RX ORDER — OXYCODONE HYDROCHLORIDE 5 MG/1
5 TABLET ORAL EVERY 6 HOURS PRN
Qty: 16 TABLET | Refills: 0 | Status: SHIPPED | OUTPATIENT
Start: 2023-04-03

## 2023-04-03 RX ORDER — FENTANYL CITRATE 50 UG/ML
50 INJECTION, SOLUTION INTRAMUSCULAR; INTRAVENOUS EVERY 5 MIN PRN
Status: DISCONTINUED | OUTPATIENT
Start: 2023-04-03 | End: 2023-04-04 | Stop reason: HOSPADM

## 2023-04-03 RX ORDER — PROPOFOL 10 MG/ML
INJECTION, EMULSION INTRAVENOUS CONTINUOUS PRN
Status: DISCONTINUED | OUTPATIENT
Start: 2023-04-03 | End: 2023-04-03

## 2023-04-03 RX ORDER — DEXAMETHASONE SODIUM PHOSPHATE 4 MG/ML
INJECTION, SOLUTION INTRA-ARTICULAR; INTRALESIONAL; INTRAMUSCULAR; INTRAVENOUS; SOFT TISSUE PRN
Status: DISCONTINUED | OUTPATIENT
Start: 2023-04-03 | End: 2023-04-03

## 2023-04-03 RX ORDER — ONDANSETRON 2 MG/ML
4 INJECTION INTRAMUSCULAR; INTRAVENOUS EVERY 30 MIN PRN
Status: DISCONTINUED | OUTPATIENT
Start: 2023-04-03 | End: 2023-04-04 | Stop reason: HOSPADM

## 2023-04-03 RX ADMIN — GABAPENTIN 300 MG: 300 CAPSULE ORAL at 10:53

## 2023-04-03 RX ADMIN — KETOROLAC TROMETHAMINE 30 MG: 30 INJECTION, SOLUTION INTRAMUSCULAR; INTRAVENOUS at 15:14

## 2023-04-03 RX ADMIN — FENTANYL CITRATE 50 MCG: 50 INJECTION, SOLUTION INTRAMUSCULAR; INTRAVENOUS at 11:13

## 2023-04-03 RX ADMIN — OXYCODONE HYDROCHLORIDE 5 MG: 5 TABLET ORAL at 14:28

## 2023-04-03 RX ADMIN — FENTANYL CITRATE 50 MCG: 50 INJECTION, SOLUTION INTRAMUSCULAR; INTRAVENOUS at 12:30

## 2023-04-03 RX ADMIN — BUPIVACAINE HYDROCHLORIDE 20 ML: 2.5 INJECTION, SOLUTION EPIDURAL; INFILTRATION; INTRACAUDAL at 11:24

## 2023-04-03 RX ADMIN — PROPOFOL 150 MCG/KG/MIN: 10 INJECTION, EMULSION INTRAVENOUS at 12:16

## 2023-04-03 RX ADMIN — FENTANYL CITRATE 50 MCG: 50 INJECTION, SOLUTION INTRAMUSCULAR; INTRAVENOUS at 14:26

## 2023-04-03 RX ADMIN — SODIUM CHLORIDE, SODIUM LACTATE, POTASSIUM CHLORIDE, CALCIUM CHLORIDE: 600; 310; 30; 20 INJECTION, SOLUTION INTRAVENOUS at 10:55

## 2023-04-03 RX ADMIN — DEXAMETHASONE SODIUM PHOSPHATE 4 MG: 4 INJECTION, SOLUTION INTRA-ARTICULAR; INTRALESIONAL; INTRAMUSCULAR; INTRAVENOUS; SOFT TISSUE at 12:25

## 2023-04-03 RX ADMIN — LIDOCAINE HYDROCHLORIDE 100 MG: 20 INJECTION, SOLUTION INFILTRATION; PERINEURAL at 12:16

## 2023-04-03 RX ADMIN — PROPOFOL 200 MG: 10 INJECTION, EMULSION INTRAVENOUS at 12:16

## 2023-04-03 RX ADMIN — CEFAZOLIN SODIUM 2 G: 1 INJECTION, POWDER, FOR SOLUTION INTRAMUSCULAR; INTRAVENOUS at 12:17

## 2023-04-03 RX ADMIN — FENTANYL CITRATE 50 MCG: 50 INJECTION, SOLUTION INTRAMUSCULAR; INTRAVENOUS at 12:15

## 2023-04-03 RX ADMIN — ACETAMINOPHEN 975 MG: 325 TABLET ORAL at 10:53

## 2023-04-03 RX ADMIN — ONDANSETRON 4 MG: 2 INJECTION INTRAMUSCULAR; INTRAVENOUS at 12:25

## 2023-04-03 NOTE — ANESTHESIA PROCEDURE NOTES
Sciatic Procedure Note    Pre-Procedure   Staff -        Anesthesiologist:  Sang Krishna MD       Resident/Fellow: Tavon Lennon DO       Performed By: fellow       Location: pre-op       Procedure Start/Stop Times: 4/3/2023 11:24 AM and 4/3/2023 11:28 AM       Pre-Anesthestic Checklist: patient identified, IV checked, site marked, risks and benefits discussed, informed consent, monitors and equipment checked, pre-op evaluation, at physician/surgeon's request and post-op pain management  Timeout:       Correct Patient: Yes        Correct Procedure: Yes        Correct Site: Yes        Correct Position: Yes        Correct Laterality: Yes        Site Marked: Yes  Procedure Documentation  Procedure: Sciatic       Diagnosis: POST OP PAIN       Laterality: right       Patient Position: supine       Skin prep: Chloraprep (popliteal approach).       Needle Type: short bevel       Needle Gauge: 21.        Needle Length (millimeters): 100        Ultrasound guided       1. Ultrasound was used to identify targeted nerve, plexus, vascular marker, or fascial plane and place a needle adjacent to it in real-time.       2. Ultrasound was used to visualize the spread of anesthetic in close proximity to the above referenced structure.       3. A permanent image is entered into the patient's record.    Assessment/Narrative         The placement was negative for: blood aspirated, painful injection and site bleeding       Paresthesias: No.       Bolus given via needle..        Secured via.        Insertion/Infusion Method: Single Shot       Complications: none    Medication(s) Administered   Bupivacaine 0.25% PF (Infiltration) - Infiltration   20 mL - 4/3/2023 11:24:00 AM  Bupivacaine liposome (Exparel) 1.3% LA inj susp (Infiltration) - Infiltration   10 mL - 4/3/2023 11:24:00 AM  Medication Administration Time: 4/3/2023 11:24 AM     Comments:  Right Sciatic Nerve Block with 133 mg liposomal bupivacaine      FOR [facility]  "([facility]) ONLY:   Pain Team Contact information: please page the Pain Team Via [employer]. Search \"Pain\". During daytime hours, please page the attending first. At night please page the resident first.      "

## 2023-04-03 NOTE — ANESTHESIA POSTPROCEDURE EVALUATION
Patient: Angie Leavitt    Procedure: Procedure(s):  Right fifth metatarsal shortening osteotomy, tendon lengthening, and capsular repair  Excisional biopsy of right fifth MTP joint mass       Anesthesia Type:  MAC    Note:  Disposition: Outpatient   Postop Pain Control: Uneventful            Sign Out: Well controlled pain   PONV: No   Neuro/Psych: Uneventful            Sign Out: Acceptable/Baseline neuro status   Airway/Respiratory: Uneventful            Sign Out: Acceptable/Baseline resp. status   CV/Hemodynamics: Uneventful            Sign Out: Acceptable CV status; No obvious hypovolemia; No obvious fluid overload   Other NRE: NONE   DID A NON-ROUTINE EVENT OCCUR? No           Last vitals:  Vitals Value Taken Time   /79 04/03/23 1445   Temp 36.2  C (97.2  F) 04/03/23 1420   Pulse 70 04/03/23 1447   Resp 15 04/03/23 1447   SpO2 98 % 04/03/23 1447   Vitals shown include unvalidated device data.    Electronically Signed By: Sang Krishna MD, MD  April 3, 2023  2:48 PM

## 2023-04-03 NOTE — DISCHARGE INSTRUCTIONS
"[facility]  Home Care Following Anesthesia  For 24 hours after surgery:  Get plenty of rest.  A responsible adult must stay with you for at least 24 hours after you leave the surgery center.  Do not drive or use heavy equipment.  If you have weakness or tingling, don't drive or use heavy equipment until this feeling goes away.   Do not drink alcohol.   Avoid strenuous or risky activities.  Ask for help when climbing stairs.  You may feel lightheaded.  IF so, sit for a few minutes before standing.  Have someone help you get up.   If you have nausea (feel sick to your stomach): Drink only clear liquids such as apple juice, ginger ale, broth or 7-Up.  Rest may also help.  Be sure to drink enough fluids.  Move to a regular diet as you feel able.   You may have a slight fever.  Call the doctor if your fever is over 100 F (37.7 C) (taken under the tongue) or lasts longer than 24 hours.  You may have a dry mouth, a sore throat, muscle aches or trouble sleeping. These should go away after 24 hours.  Do not make important or legal decisions.   It is recommended to avoid smoking.        Today you received an Exparel block to numb the nerves near your surgery site.  This is a block using local anesthetic or \"numbing\" medication injected around the nerves to anesthetize or \"numb\" the area supplied by those nerves.  This block is injected into the muscle layer near your surgical site.  This medication may numb the location where you had surgery up to 72 hours.  If your surgical site is an arm or leg you should be careful with your affected limb, since it is possible to injure your limb without being aware of it due to the numbing.  Until full feeling returns, you should guard against bumping or hitting your limb, and avoid extreme hot or cold temperatures on the skin.  As the block wears off, the feeling will return as a tingling or prickly sensation near your surgical site.  You will " experince more discomfort from your incision as the feeling returns.  You may want to take a pain pill (a narcotic or Tylenol if this was prescribed by your surgeon) when you start to experience mild pain before the pain beomes more severe.  If your pain medications do not control your pain, you should notify your surgeon.    Tips for taking pain medications  To get the best pain relief possible, remember these points:  Take pain medications as directed, before pain becomes severe.  Pain medication can upset your stomach: taking it with food may help.  Constipation is a common side effect of pain medication. Drink plenty of  fluids.  Eat foods high in fiber. Take a stool softener if recommended by your doctor or pharmacist.  Do not drink alcohol, drive or operate machinery while taking pain medications.  Ask about other ways to control pain, such as with heat, ice or relaxation.    Tylenol/Acetaminophen Consumption  To help encourage the safe use of acetaminophen, the makers of TYLENOL  have lowered the maximum daily dose for single-ingredient Extra Strength TYLENOL  (acetaminophen) products sold in the [place] from 8 pills per day (4,000 mg) to 6 pills per day (3,000 mg). The dosing interval has also changed from 2 pills every 4-6 hours to 2 pills every 6 hours.  If you feel your pain relief is insufficient, you may take Tylenol/Acetaminophen in addition to your narcotic pain medication.   Be careful not to exceed 3,000 mg of Tylenol/Acetaminophen in a 24 hour period from all sources.  If you are taking extra strength Tylenol/acetaminophen (500 mg), the maximum dose is 6 tablets in 24 hours.  If you are taking regular strength acetaminophen (325 mg), the maximum dose is 9 tablets in 24 hours.    Call a doctor for any of the following:  Signs of infection (fever, growing tenderness at the surgery site, a large amount of drainage or bleeding, severe pain, foul-smelling drainage, redness, swelling).  It has been over 8  to 10 hours since surgery and you are still not able to urinate (pass water).  Headache for over 24 hours.  Numbness, tingling or weakness the day after surgery (if you had spinal anesthesia).  Signs of Covid-19 infection (temperature over 100 degrees, shortness of breath, cough, loss of taste/smell, generalized body aches, persistent headache, chills, sore throat, nausea/vomiting/diarrhea)  Your doctor is:       Dr. Jose Antonio Calix, Orthopaedics: [phone]               Or dial [phone] and ask for the resident on call for:  Orthopaedics  For emergency care, call the:  [facility] [phone] (TTY for hearing impaired: [phone])  Post Operative Instructions: Regional Anesthetic for Lower Extremity     General Information:   Regional anesthesia is when local anesthetic or  numbing  medication is injected around the nerves to anesthetize or  numb  the area supplied by that set of nerves. It is a type of analgesia used to control pain and decreases the need for narcotics following surgery.    Types of Regional Blocks:  Femoral: A block injected into the groin area of the operative leg of a patient having thigh or knee surgery.  Adductor Canal: A block injected into the mid thigh of the operative leg of a patient having knee or ankle surgery.  Popliteal or Distal Sciatic: A block injected into the back of the knee of the operative leg of patients having foot or ankle surgery.   Ankle:  An anesthetic medication is injected into the ankle of the operative leg of a patient having foot or toe surgery.     Procedure:   The type of anesthesia your doctor used to numb your leg will usually not wear off for 6-18 hours, but may last as long as 24 hours. You should be careful during that period, since it is possible to injure your leg without being aware of the injury. While your leg is numb you should:  Use crutches (minimal weight bearing until your motor and strength is completely back to  normal)  Avoid striking or bumping your leg  Avoid extreme hot or cold    Discomfort:  You will have a tingling and prickly sensation in your leg as the feeling begins to return; you can also expect some discomfort. The amount of discomfort is unpredictable, but if you have more pain than can be controlled with pain medication, you should notify your physician.     Pain Medicine:   Begin taking your oral pain pills (if you have not already done so) before bedtime and during the night to avoid a sudden onset of pain as the block wears off.  Do not engage in drinking, driving, or hazardous occupations while taking pain medication.

## 2023-04-03 NOTE — ANESTHESIA PREPROCEDURE EVALUATION
Anesthesia Pre-Procedure Evaluation    Patient: Angie Leavitt   MRN: [id number] : 1965        Procedure : Procedure(s):  Right fifth metatarsal shortening osteotomy, tendon lengthening, and capsular repair  Excisional biopsy of right fifth MTP joint mass          No past medical history on file.   History reviewed. No pertinent surgical history.   No Known Allergies   Social History     Tobacco Use     Smoking status: Never     Smokeless tobacco: Never   Vaping Use     Vaping status: Not on file   Substance Use Topics     Alcohol use: Not on file      Wt Readings from Last 1 Encounters:   23 68 kg (150 lb)        Anesthesia Evaluation            ROS/MED HX  ENT/Pulmonary:  - neg pulmonary ROS     Neurologic:  - neg neurologic ROS     Cardiovascular:  - neg cardiovascular ROS     METS/Exercise Tolerance:     Hematologic:  - neg hematologic  ROS     Musculoskeletal:       GI/Hepatic:  - neg GI/hepatic ROS     Renal/Genitourinary:  - neg Renal ROS     Endo:  - neg endo ROS     Psychiatric/Substance Use:  - neg psychiatric ROS     Infectious Disease:  - neg infectious disease ROS     Malignancy:       Other:               OUTSIDE LABS:  CBC:   Lab Results   Component Value Date    WBC 7.2 2023    HGB 12.8 2023    HCT 38.3 2023     2023     BMP: No results found for: NA, POTASSIUM, CHLORIDE, CO2, BUN, CR, GLC  COAGS: No results found for: PTT, INR, FIBR  POC: No results found for: BGM, HCG, HCGS  HEPATIC: No results found for: ALBUMIN, PROTTOTAL, ALT, AST, GGT, ALKPHOS, BILITOTAL, BILIDIRECT, GINA  OTHER:   Lab Results   Component Value Date    SED 7 2023       Anesthesia Plan    ASA Status:  1      Anesthesia Type: MAC.     - Reason for MAC: immobility needed, straight local not clinically adequate              Consents    Anesthesia Plan(s) and associated risks, benefits, and realistic alternatives discussed. Questions answered and patient/representative(s)  expressed understanding.     - Discussed: Risks, Benefits and Alternatives for BOTH SEDATION and the PROCEDURE were discussed     - Discussed with:  Patient      - Extended Intubation/Ventilatory Support Discussed: No.      - Patient is DNR/DNI Status: No    Use of blood products discussed: No .     Postoperative Care    Pain management: IV analgesics, Oral pain medications, Peripheral nerve block (Single Shot).   PONV prophylaxis: Ondansetron (or other 5HT-3)     Comments:                Sang Krishna MD, MD

## 2023-04-03 NOTE — ANESTHESIA CARE TRANSFER NOTE
Patient: Angie Leavitt    Procedure: Procedure(s):  Right fifth metatarsal shortening osteotomy, tendon lengthening, and capsular repair  Excisional biopsy of right fifth MTP joint mass       Diagnosis: Mass of joint of right foot [M25.871]  Dislocation of fifth toe, right, closed, subsequent encounter [S93.104D]  Diagnosis Additional Information: No value filed.    Anesthesia Type:   MAC     Note:      Level of Consciousness: awake  Oxygen Supplementation: nasal cannula    Independent Airway: airway patency satisfactory and stable        Patient transferred to: PACU    Handoff Report: Identifed the Patient, Identified the Reponsible Provider, Reviewed the pertinent medical history, Discussed the surgical course, Reviewed Intra-OP anesthesia mangement and issues during anesthesia, Set expectations for post-procedure period and Allowed opportunity for questions and acknowledgement of understanding      Vitals:  Vitals Value Taken Time   /76 04/03/23 1419   Temp     Pulse 75 04/03/23 1421   Resp 11 04/03/23 1421   SpO2 95 % 04/03/23 1421   Vitals shown include unvalidated device data.    Electronically Signed By: JERALD Pak CRNA  April 3, 2023  2:22 PM

## 2023-04-03 NOTE — BRIEF OP NOTE
[facility]    Brief Operative Note    Pre-operative diagnosis: Mass of joint of right foot [M25.871]  Dislocation of fifth toe, right, closed, subsequent encounter [F48.556M]  Post-operative diagnosis Same as pre-operative diagnosis    Procedure: Procedure(s):  Right fifth metatarsal shortening osteotomy, tendon lengthening, and capsular repair  Excisional biopsy of right fifth MTP joint mass  Surgeon: Surgeon(s) and Role:     * Jose Antonio Calix MD - Primary     * Only, MD Chandler - Resident - Assisting  Anesthesia: General with Block   Estimated Blood Loss: 10 mL from 4/3/2023 12:11 PM to 4/3/2023  2:17 PM      Drains: None  Specimens:   ID Type Source Tests Collected by Time Destination   1 : Right Fifth MTP Joint Mass Tissue Toe, Right SURGICAL PATHOLOGY EXAM Jose Antonio Calix MD 4/3/2023  1:44 PM      Findings:   No discrete, focal mass noted. Scar tissue at the site of previous mass..  Complications: None.  Implants:   Implant Name Type Inv. Item Serial No.  Lot No. LRB No. Used Action   Arthrex Snap off Screw 2.0 x 12 mm Metallic Hardware/[place]   [employer]  Right 2 Implanted         Addendum:  I spoke with the patient's responsible party, her spouse Duarte, postoperatively today by phone at [phone]. Findings and plan were discussed; all questions answered.  Jose Antonio Calix MD  Attending surgeon  Orthopaedic Surgery  Date of Addendum: 04/03/23

## 2023-04-03 NOTE — OR NURSING
Patient received right side sciatic nerve block  with Exparel.  Fentanyl 50mcg and Versed 2mg given. Tolerated procedure well.

## 2023-04-05 LAB
PATH REPORT.COMMENTS IMP SPEC: NORMAL
PATH REPORT.COMMENTS IMP SPEC: NORMAL
PATH REPORT.FINAL DX SPEC: NORMAL
PATH REPORT.GROSS SPEC: NORMAL
PATH REPORT.MICROSCOPIC SPEC OTHER STN: NORMAL
PATH REPORT.RELEVANT HX SPEC: NORMAL
PHOTO IMAGE: NORMAL

## 2023-04-17 ENCOUNTER — OFFICE VISIT (OUTPATIENT)
Dept: ORTHOPEDICS | Facility: CLINIC | Age: 58
End: 2023-04-17
Payer: COMMERCIAL

## 2023-04-17 DIAGNOSIS — Z98.890 STATUS POST FOOT SURGERY: Primary | ICD-10-CM

## 2023-04-17 PROCEDURE — 99024 POSTOP FOLLOW-UP VISIT: CPT

## 2023-04-17 NOTE — PROGRESS NOTES
Reason for visit:    Angie Leavitt came in to the clinic for a two week post op check.    Her surgery was done 4/3/23 by Dr Calix.  She had a right fifth MTP joint reconstruction including extensor tendon Z-lengthening, fifth metatarsal shortening osteotomy with internal fixation, and lateral capsular imbrication. (2) Incisional biopsy of right fifth metatarsophalangeal joint capsular thickening.    Assessment:    Angie came into the clinic in a short CAM walker Non-WB, using crutches.    The Surgical wounds were exposed and found to be well-healed; so the sutures were removed. Skin was c/d/i. Steri strips were applied. Angie reports to be doing very well.    Plan:     She was placed back into her CAM walker.  She was told to remain Non-WB. She may remove the boot for hygiene only.     She has an appointment to see Dr. Calix at 6 weeks post op and at that time Dr. Calix will determine further restrictions.    She has our phone number and will call with questions or problems.    Miryam Garcia ATC

## 2023-05-01 NOTE — ADDENDUM NOTE
Addendum  created 05/01/23 1441 by Sang Krishna MD    Attestation recorded in Intraprocedure, Flowsheet accepted, Intraprocedure Attestations filed

## 2023-05-11 DIAGNOSIS — Z98.890 STATUS POST FOOT SURGERY: Primary | ICD-10-CM

## 2023-05-12 ENCOUNTER — OFFICE VISIT (OUTPATIENT)
Dept: ORTHOPEDICS | Facility: CLINIC | Age: 58
End: 2023-05-12
Payer: COMMERCIAL

## 2023-05-12 ENCOUNTER — ANCILLARY PROCEDURE (OUTPATIENT)
Dept: GENERAL RADIOLOGY | Facility: CLINIC | Age: 58
End: 2023-05-12
Attending: ORTHOPAEDIC SURGERY
Payer: COMMERCIAL

## 2023-05-12 DIAGNOSIS — Z98.890 STATUS POST FOOT SURGERY: ICD-10-CM

## 2023-05-12 DIAGNOSIS — S93.104D: ICD-10-CM

## 2023-05-12 DIAGNOSIS — R22.41 FOOT MASS, RIGHT: Primary | ICD-10-CM

## 2023-05-12 DIAGNOSIS — M05.79 RHEUMATOID ARTHRITIS INVOLVING MULTIPLE SITES WITH POSITIVE RHEUMATOID FACTOR (H): ICD-10-CM

## 2023-05-12 PROCEDURE — 99024 POSTOP FOLLOW-UP VISIT: CPT | Performed by: ORTHOPAEDIC SURGERY

## 2023-05-12 PROCEDURE — 73630 X-RAY EXAM OF FOOT: CPT | Mod: RT | Performed by: RADIOLOGY

## 2023-05-12 NOTE — PROGRESS NOTES
DOS: 05/24/2022 - R 5th MTP mass excision by Dr. 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  She denies any pain and is very happy with the progress.  She states everything is going well.  She denies any interim problems.  She denies fevers, chills, chest pain, or shortness of breath.  She has put some weight on the right foot, and denies any pain or problems with this.  She is keen on getting back to activities such as swimming, gardening, and doing yoga classes.  I reviewed her pathology results with the patient from her 04/03/2023 surgery, showing no evidence for malignancy, and final diagnosis of scar tissue.    Examination today shows the patient to be a pleasant, cooperative, awake, and alert adult sitting upright in a chair in no acute distress.  She has her boot and crutches with her.  She is nonseptic appearing from a general clinical standpoint.  Breathing pattern is regular and nonlabored on room air.  The boot is doffed for examination. The right foot including all 5 toes is warm and well-perfused, with a well-healed surgical incision on the dorsolateral right 5th metatarsal/MTP joint with no evidence for infection, drainage, or dehiscence.  The surgical site is completely nontender to palpation.  There is mild medial deviation of the fifth toe, but it appears much improved compared with previously.  The hardware does not appear to be prominent or palpable.  The 5th MTP jointdoes not appear to be in either a hyperflexed or hyperdorsiflexed position, but rather neutral in the sagittal plane.  The contralateral left 5th toe does have significantly more medial  deviation and under lapping under the fourth toe in comparison with the recently operated right 5th toe.    Independent review of images was performed including weightbearing right foot AP, oblique, and lateral radiographs.  Images were discussed with and shown to the patient, and compared with previous images.  There does appear to be a well aligned fifth metatarsal osteotomy with the osteotomy line still somewhat visible, stable appearing, and with stable fixation with 2 intact screws.  No signs for hardware failure or impending hardware failure. Alignment appears to be stable.  The fifth toe MTP joint alignment does appear to be significantly improved compared with preoperatively.    Assessment: 57-year-old female patient with rheumatoid arthritis and history of right fifth MTP joint dislocation and mass recurrence after mass excision.  Pathology results show scar tissue with no evidence for malignancy.    Plan: She may now start to weight-bear as tolerated on the right foot and wean off of her crutches.  Taping was discussed and demonstrated.  A toe spacer was also provided today.  Appropriate activities were discussed including swimming and chlorinated pool but not lake or river water yet, yoga with exception of single-leg stance on the right foot, or any hyperdorsiflexion of the right foot toes, and low-intensity biking/treadmill/elliptical.  Follow-up in 6 weeks with weightbearing radiographs of the right foot.  Signs and symptoms to watch out for that should prompt sooner medical attention were discussed.  Ms. Leavitt verbalized understanding of the plan, and all questions were answered.

## 2023-05-12 NOTE — LETTER
5/12/2023         RE: Angie Leavitt  [address] [place] [address]        Dear Colleague,    Thank you for referring your patient, Angie Leavitt, to the [facility] [place]. Please see a copy of my visit note below.    DOS: 05/24/2022 - R 5th MTP mass excision by Dr. 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  She denies any pain and is very happy with the progress.  She states everything is going well.  She denies any interim problems.  She denies fevers, chills, chest pain, or shortness of breath.  She has put some weight on the right foot, and denies any pain or problems with this.  She is keen on getting back to activities such as swimming, gardening, and doing yoga classes.  I reviewed her pathology results with the patient from her 04/03/2023 surgery, showing no evidence for malignancy, and final diagnosis of scar tissue.    Examination today shows the patient to be a pleasant, cooperative, awake, and alert adult sitting upright in a chair in no acute distress.  She has her boot and crutches with her.  She is nonseptic appearing from a general clinical standpoint.  Breathing pattern is regular and nonlabored on room air.  The boot is doffed for examination. The right foot including all 5 toes is warm and well-perfused, with a well-healed surgical incision on the dorsolateral right 5th metatarsal/MTP joint with no evidence for infection, drainage, or dehiscence.  The surgical site is completely nontender to palpation.  There is mild medial deviation of the fifth toe, but it appears much improved compared with previously.  The  hardware does not appear to be prominent or palpable.  The 5th MTP jointdoes not appear to be in either a hyperflexed or hyperdorsiflexed position, but rather neutral in the sagittal plane.  The contralateral left 5th toe does have significantly more medial deviation and under lapping under the fourth toe in comparison with the recently operated right 5th toe.    Independent review of images was performed including weightbearing right foot AP, oblique, and lateral radiographs.  Images were discussed with and shown to the patient, and compared with previous images.  There does appear to be a well aligned fifth metatarsal osteotomy with the osteotomy line still somewhat visible, stable appearing, and with stable fixation with 2 intact screws.  No signs for hardware failure or impending hardware failure. Alignment appears to be stable.  The fifth toe MTP joint alignment does appear to be significantly improved compared with preoperatively.    Assessment: 57-year-old female patient with rheumatoid arthritis and history of right fifth MTP joint dislocation and mass recurrence after mass excision.  Pathology results show scar tissue with no evidence for malignancy.    Plan: She may now start to weight-bear as tolerated on the right foot and wean off of her crutches.  Taping was discussed and demonstrated.  A toe spacer was also provided today.  Appropriate activities were discussed including swimming and chlorinated pool but not lake or river water yet, yoga with exception of single-leg stance on the right foot, or any hyperdorsiflexion of the right foot toes, and low-intensity biking/treadmill/elliptical.  Follow-up in 6 weeks with weightbearing radiographs of the right foot.  Signs and symptoms to watch out for that should prompt sooner medical attention were discussed.  Ms. Leavitt verbalized understanding of the plan, and all questions were answered.      Jose Antonio Calix MD

## 2023-06-06 DIAGNOSIS — R22.41 FOOT MASS, RIGHT: Primary | ICD-10-CM

## 2023-06-06 DIAGNOSIS — Z98.890 STATUS POST FOOT SURGERY: ICD-10-CM

## 2023-06-16 ENCOUNTER — ANCILLARY PROCEDURE (OUTPATIENT)
Dept: GENERAL RADIOLOGY | Facility: CLINIC | Age: 58
End: 2023-06-16
Attending: ORTHOPAEDIC SURGERY
Payer: COMMERCIAL

## 2023-06-16 ENCOUNTER — OFFICE VISIT (OUTPATIENT)
Dept: ORTHOPEDICS | Facility: CLINIC | Age: 58
End: 2023-06-16
Payer: COMMERCIAL

## 2023-06-16 DIAGNOSIS — S93.104D: ICD-10-CM

## 2023-06-16 DIAGNOSIS — Z98.890 STATUS POST FOOT SURGERY: ICD-10-CM

## 2023-06-16 DIAGNOSIS — M25.871 MASS OF JOINT OF RIGHT FOOT: Primary | ICD-10-CM

## 2023-06-16 DIAGNOSIS — R22.41 FOOT MASS, RIGHT: ICD-10-CM

## 2023-06-16 PROCEDURE — 99024 POSTOP FOLLOW-UP VISIT: CPT | Performed by: ORTHOPAEDIC SURGERY

## 2023-06-16 PROCEDURE — 73630 X-RAY EXAM OF FOOT: CPT | Mod: RT | Performed by: RADIOLOGY

## 2023-06-16 NOTE — PROGRESS NOTES
Orthopaedic Surgery Clinic - follow up    DOS:  05/24/2022 - Right fifth MTP mass excision by Dr. Kali DPM, at [facility].  DOS: 04/03/2023 - Revision right fifth MTP mass excision, reduction of chronic right fifth MTP dislocation, fifth MT shortening, and EDL lengthening.    I saw Ms. Leavitt today in follow-up for her right fifth toe.  She reports that she is doing well, and that the swelling has come down.  The pathology report has been reviewed, and there was no evidence for malignancy.  There was evidence for scar tissue, mild chronic inflammation, and hemosiderin pigment.    Examination shows the surgical incision to be well-healed without any overt signs for infection.  There is minimal to mild swelling in the right fifth MTP and fifth toe.  The surgical site including fifth metatarsal appears to be nontender to palpation, and the fifth toe appears clinically rectus without visible or palpable evidence for recurrent deformity or mass.    Independent review of imaging including right foot radiographs dated 6/16/2023 shows good alignment of the fifth metatarsal shortening osteotomy with intact stable appearing screws, and evidence for some interval healing, as well as good alignment of the fifth MTP joint without evidence for recurrent dislocation.    Impression Ms. Leavitt is doing well approximately 10-1/2 weeks status post right fifth MTP revision mass excision for recurrent mass, with no evidence for neoplasm or acute inflammation, reduction of a chronic right fifth MTP dislocation, fifth metatarsal shortening osteotomy, and EDL tendon lengthening.    Plan: She may start to gradually increase her activities as tolerated.  I did discuss that there is a risk for recurrent dislocation, and that further treatment may be necessary should this occur.  Ways to help mitigate this were discussed.  A Budin splint for the fifth MTP joint was provided, and the rationale for use as well as instructions for  donning and doffing were discussed.  She may also try to continue taping and.  Appropriate shoewear and precautions during her activities were discussed.  She would like to get back into running and yoga.  The pros and cons of returning to running were discussed including the risk of recurrent dislocation.  Signs and symptoms that should warrant medical attention were discussed.  I asked her to come back to see me should she have any concerns with her right foot at all. All questions this very pleasant patient had at this time were answered appropriately, and she verbalized understanding of and agreement with the above treatment plan.      Disclaimer: This note consists of symbols derived from keyboarding, dictation, and/or voice recognition software. As a result, there may be errors in the script that have gone undetected. Please consider this when interpreting information found in this chart.

## 2023-06-16 NOTE — LETTER
6/16/2023         RE: Angie Leavitt  [address] [place] [address]        Dear Colleague,    Thank you for referring your patient, Angie Leavitt, to the [facility] [place]. Please see a copy of my visit note below.    Orthopaedic Surgery Clinic - follow up    DOS:  05/24/2022 - Right fifth MTP mass excision by Dr. Kali DPM, at [facility].  DOS: 04/03/2023 - Revision right fifth MTP mass excision, reduction of chronic right fifth MTP dislocation, fifth MT shortening, and EDL lengthening.    I saw Ms. Leavitt today in follow-up for her right fifth toe.  She reports that she is doing well, and that the swelling has come down.  The pathology report has been reviewed, and there was no evidence for malignancy.  There was evidence for scar tissue, mild chronic inflammation, and hemosiderin pigment.    Examination shows the surgical incision to be well-healed without any overt signs for infection.  There is minimal to mild swelling in the right fifth MTP and fifth toe.  The surgical site including fifth metatarsal appears to be nontender to palpation, and the fifth toe appears clinically rectus without visible or palpable evidence for recurrent deformity or mass.    Independent review of imaging including right foot radiographs dated 6/16/2023 shows good alignment of the fifth metatarsal shortening osteotomy with intact stable appearing screws, and evidence for some interval healing, as well as good alignment of the fifth MTP joint without evidence for recurrent dislocation.    Impression Ms. Leavitt is doing well approximately 10-1/2 weeks status post right fifth MTP revision mass excision for recurrent mass, with no evidence for neoplasm or acute inflammation, reduction of a chronic right fifth MTP dislocation, fifth metatarsal shortening osteotomy, and EDL tendon lengthening.    Plan: She may start to gradually increase her activities as tolerated.  I did discuss that  there is a risk for recurrent dislocation, and that further treatment may be necessary should this occur.  Ways to help mitigate this were discussed.  A Budin splint for the fifth MTP joint was provided, and the rationale for use as well as instructions for donning and doffing were discussed.  She may also try to continue taping and.  Appropriate shoewear and precautions during her activities were discussed.  She would like to get back into running and yoga.  The pros and cons of returning to running were discussed including the risk of recurrent dislocation.  Signs and symptoms that should warrant medical attention were discussed.  I asked her to come back to see me should she have any concerns with her right foot at all. All questions this very pleasant patient had at this time were answered appropriately, and she verbalized understanding of and agreement with the above treatment plan.      Disclaimer: This note consists of symbols derived from keyboarding, dictation, and/or voice recognition software. As a result, there may be errors in the script that have gone undetected. Please consider this when interpreting information found in this chart.      Again, thank you for allowing me to participate in the care of your patient.        Sincerely,        Jose Antonio Calix MD

## 2024-03-10 ENCOUNTER — HEALTH MAINTENANCE LETTER (OUTPATIENT)
Age: 59
End: 2024-03-10

## 2025-03-16 ENCOUNTER — HEALTH MAINTENANCE LETTER (OUTPATIENT)
Age: 60
End: 2025-03-16

## 2025-08-10 ENCOUNTER — HEALTH MAINTENANCE LETTER (OUTPATIENT)
Age: 60
End: 2025-08-10

## (undated) DEVICE — DRSG DRAIN 2X2" 7087

## (undated) DEVICE — TOURNIQUET SGL  BLADDER 30"X4" BLUE 5921030135

## (undated) DEVICE — PREP CHLORAPREP 26ML TINTED ORANGE  260815

## (undated) DEVICE — DRAPE C-ARM W/STRAPS 42X72" 07-CA104

## (undated) DEVICE — GLOVE BIOGEL PI SZ 8.0 40880

## (undated) DEVICE — SU FIBERWIRE 2-0 TAPER CUT AR-7220

## (undated) DEVICE — BLADE SAW OSCIL/SAG STRK MICRO 7.0X18.5X0.38MM 2296-003-114

## (undated) DEVICE — ESU GROUND PAD ADULT W/CORD E7507

## (undated) DEVICE — SOL NACL 0.9% IRRIG 500ML BOTTLE 2F7123

## (undated) DEVICE — DRSG GAUZE 4X4" TRAY 6939

## (undated) DEVICE — CAST STOCKINETTE 3" COTTON 30-7003

## (undated) DEVICE — GLOVE BIOGEL PI MICRO INDICATOR UNDERGLOVE SZ 8.0 48980

## (undated) DEVICE — PACK LOWER EXTREMITY CUSTOM ASC

## (undated) DEVICE — SUCTION MANIFOLD NEPTUNE 2 SYS 1 PORT 702-025-000

## (undated) DEVICE — LINEN ORTHO PACK 5446

## (undated) RX ORDER — BUPIVACAINE HYDROCHLORIDE 5 MG/ML
INJECTION, SOLUTION EPIDURAL; INTRACAUDAL
Status: DISPENSED
Start: 2023-04-03

## (undated) RX ORDER — ACETAMINOPHEN 325 MG/1
TABLET ORAL
Status: DISPENSED
Start: 2023-04-03

## (undated) RX ORDER — DEXAMETHASONE SODIUM PHOSPHATE 4 MG/ML
INJECTION, SOLUTION INTRA-ARTICULAR; INTRALESIONAL; INTRAMUSCULAR; INTRAVENOUS; SOFT TISSUE
Status: DISPENSED
Start: 2023-04-03

## (undated) RX ORDER — KETOROLAC TROMETHAMINE 30 MG/ML
INJECTION, SOLUTION INTRAMUSCULAR; INTRAVENOUS
Status: DISPENSED
Start: 2023-04-03

## (undated) RX ORDER — FENTANYL CITRATE 50 UG/ML
INJECTION, SOLUTION INTRAMUSCULAR; INTRAVENOUS
Status: DISPENSED
Start: 2023-04-03

## (undated) RX ORDER — PROPOFOL 10 MG/ML
INJECTION, EMULSION INTRAVENOUS
Status: DISPENSED
Start: 2023-04-03

## (undated) RX ORDER — CEFAZOLIN SODIUM 1 G/3ML
INJECTION, POWDER, FOR SOLUTION INTRAMUSCULAR; INTRAVENOUS
Status: DISPENSED
Start: 2023-04-03

## (undated) RX ORDER — ONDANSETRON 2 MG/ML
INJECTION INTRAMUSCULAR; INTRAVENOUS
Status: DISPENSED
Start: 2023-04-03

## (undated) RX ORDER — OXYCODONE HYDROCHLORIDE 5 MG/1
TABLET ORAL
Status: DISPENSED
Start: 2023-04-03

## (undated) RX ORDER — GABAPENTIN 300 MG/1
CAPSULE ORAL
Status: DISPENSED
Start: 2023-04-03